# Patient Record
Sex: FEMALE | Race: WHITE | Employment: FULL TIME | URBAN - METROPOLITAN AREA
[De-identification: names, ages, dates, MRNs, and addresses within clinical notes are randomized per-mention and may not be internally consistent; named-entity substitution may affect disease eponyms.]

---

## 2017-05-17 ENCOUNTER — OFFICE VISIT (OUTPATIENT)
Dept: FAMILY MEDICINE CLINIC | Age: 42
End: 2017-05-17

## 2017-05-17 VITALS
DIASTOLIC BLOOD PRESSURE: 60 MMHG | BODY MASS INDEX: 34.74 KG/M2 | HEIGHT: 61 IN | HEART RATE: 68 BPM | RESPIRATION RATE: 16 BRPM | TEMPERATURE: 98.1 F | SYSTOLIC BLOOD PRESSURE: 120 MMHG | WEIGHT: 184 LBS

## 2017-05-17 DIAGNOSIS — J06.9 VIRAL UPPER RESPIRATORY TRACT INFECTION: Primary | ICD-10-CM

## 2017-05-17 PROCEDURE — 99213 OFFICE O/P EST LOW 20 MIN: CPT | Performed by: FAMILY MEDICINE

## 2017-05-17 RX ORDER — DEXTROMETHORPHAN HYDROBROMIDE AND PROMETHAZINE HYDROCHLORIDE 15; 6.25 MG/5ML; MG/5ML
5 SYRUP ORAL 4 TIMES DAILY PRN
Qty: 240 ML | Refills: 0 | Status: SHIPPED | OUTPATIENT
Start: 2017-05-17 | End: 2017-05-24

## 2017-05-17 RX ORDER — FLUTICASONE PROPIONATE 50 MCG
1 SPRAY, SUSPENSION (ML) NASAL DAILY
Qty: 1 BOTTLE | Refills: 5 | Status: SHIPPED | OUTPATIENT
Start: 2017-05-17 | End: 2020-02-28 | Stop reason: ALTCHOICE

## 2018-01-26 ENCOUNTER — OFFICE VISIT (OUTPATIENT)
Dept: FAMILY MEDICINE CLINIC | Age: 43
End: 2018-01-26

## 2018-01-26 VITALS
OXYGEN SATURATION: 98 % | WEIGHT: 193 LBS | RESPIRATION RATE: 16 BRPM | HEIGHT: 61 IN | TEMPERATURE: 98.5 F | HEART RATE: 62 BPM | SYSTOLIC BLOOD PRESSURE: 118 MMHG | DIASTOLIC BLOOD PRESSURE: 60 MMHG | BODY MASS INDEX: 36.44 KG/M2

## 2018-01-26 DIAGNOSIS — J20.9 ACUTE BRONCHITIS, UNSPECIFIED ORGANISM: Primary | ICD-10-CM

## 2018-01-26 PROCEDURE — G8417 CALC BMI ABV UP PARAM F/U: HCPCS | Performed by: FAMILY MEDICINE

## 2018-01-26 PROCEDURE — G8427 DOCREV CUR MEDS BY ELIG CLIN: HCPCS | Performed by: FAMILY MEDICINE

## 2018-01-26 PROCEDURE — 1036F TOBACCO NON-USER: CPT | Performed by: FAMILY MEDICINE

## 2018-01-26 PROCEDURE — 99213 OFFICE O/P EST LOW 20 MIN: CPT | Performed by: FAMILY MEDICINE

## 2018-01-26 PROCEDURE — G8484 FLU IMMUNIZE NO ADMIN: HCPCS | Performed by: FAMILY MEDICINE

## 2018-01-26 RX ORDER — DEXTROMETHORPHAN HYDROBROMIDE AND PROMETHAZINE HYDROCHLORIDE 15; 6.25 MG/5ML; MG/5ML
5 SYRUP ORAL 4 TIMES DAILY PRN
Qty: 240 ML | Refills: 0 | Status: SHIPPED | OUTPATIENT
Start: 2018-01-26 | End: 2018-02-02

## 2018-01-26 RX ORDER — AZITHROMYCIN 250 MG/1
TABLET, FILM COATED ORAL
Qty: 1 PACKET | Refills: 0 | Status: SHIPPED | OUTPATIENT
Start: 2018-01-26 | End: 2018-01-31

## 2018-01-26 RX ORDER — PREDNISONE 20 MG/1
20 TABLET ORAL 2 TIMES DAILY
Qty: 10 TABLET | Refills: 0 | Status: SHIPPED | OUTPATIENT
Start: 2018-01-26 | End: 2018-01-31

## 2018-01-26 ASSESSMENT — PATIENT HEALTH QUESTIONNAIRE - PHQ9
SUM OF ALL RESPONSES TO PHQ QUESTIONS 1-9: 0
2. FEELING DOWN, DEPRESSED OR HOPELESS: 0
1. LITTLE INTEREST OR PLEASURE IN DOING THINGS: 0
SUM OF ALL RESPONSES TO PHQ9 QUESTIONS 1 & 2: 0

## 2018-01-26 NOTE — PROGRESS NOTES
Subjective:      Chief Complaint   Patient presents with    Cough     x 2 weeks       Gina Echevarria is a 40 y.o. female who presents for evaluation of symptoms of URI. Onset of symptoms was 2 weeks ago. Symptoms include low grade fever, nasal congestion, productive cough with  brown colored sputum, shortness of breath, wheezing and post nasal drip and are gradually worsening since that time. Other symptoms: frontal bilat headaches  Denies:high fever. Tried OTC: cough drops, tylenol with some relief of symptoms     Review of Systems  Pertinent items are noted in HPI. Patient's medications, allergies, past medical, surgical, social and family histories were reviewed and updated as appropriate. Current Outpatient Prescriptions on File Prior to Visit   Medication Sig Dispense Refill    fluticasone (FLONASE) 50 MCG/ACT nasal spray 1 spray by Nasal route daily 1 Bottle 5    Liraglutide -Weight Management (SAXENDA) 6 MG/ML SOPN Inject 1.2 mg/mL into the skin daily      cabergoline (DOSTINEX) 0.5 MG tablet 0.5 mg Twice a Week        No current facility-administered medications on file prior to visit.       Allergies   Allergen Reactions    Dilantin [Phenytoin Sodium]     Inderal La [Propranolol Hcl]     Levofloxacin Hives    Phenobarbital Sodium     Sulfa Antibiotics Swelling     Past Medical History:   Diagnosis Date    BCC (basal cell carcinoma of skin), nose     Diabetes mellitus arising in pregnancy     Hypercholesterolemia     Hyperprolactinemia (HCC)     Nausea & vomiting     Pituitary tumor     Seizure disorder, Hx of     Unspecified urethral stricture (Hx of)      Past Surgical History:   Procedure Laterality Date    ABDOMINAL EXPLORATION SURGERY      endometriosis    BREAST SURGERY  2011    left ductectomy- BENIGN    CHOLECYSTECTOMY      HERNIA REPAIR      umbilical    NOSE SURGERY      broken nose    TONSILLECTOMY AND ADENOIDECTOMY      WISDOM TOOTH EXTRACTION       Social History     Social History    Marital status:      Spouse name: N/A    Number of children: N/A    Years of education: N/A     Occupational History    Not on file. Social History Main Topics    Smoking status: Never Smoker    Smokeless tobacco: Never Used      Comment: congrats    Alcohol use 2.0 oz/week     4 drink(s) per week    Drug use: No    Sexual activity: Yes     Partners: Male     Other Topics Concern    Not on file     Social History Narrative    No narrative on file     Family History   Problem Relation Age of Onset    Diabetes Father     Cancer Maternal Grandmother 28     breast     Health Maintenance   Topic Date Due    HIV screen  09/18/1991    Cervical cancer screen  09/18/1997    DTaP/Tdap/Td vaccine (2 - Td) 07/03/2016    Lipid screen  09/18/2016    Diabetes screen  09/18/2016    Flu vaccine (1) 09/01/2017       Objective:   PHYSICAL EXAM  /60 (Site: Right Arm, Position: Sitting, Cuff Size: Large Adult)   Pulse 62   Temp 98.5 °F (36.9 °C) (Oral)   Resp 16   Ht 5' 1\" (1.549 m)   Wt 193 lb (87.5 kg)   LMP 01/16/2018   SpO2 98%   BMI 36.47 kg/m²   Wt Readings from Last 3 Encounters:   01/26/18 193 lb (87.5 kg)   05/17/17 184 lb (83.5 kg)   06/23/15 194 lb (88 kg)     BP Readings from Last 3 Encounters:   01/26/18 118/60   05/17/17 120/60   06/23/15 118/70     GENERAL: well-developed, well-nourished, alert, no distress  EYES: negative findings: lids and lashes normal and conjunctivae and sclerae normal  ENT: normal TM's and external ear canals both ears  · External nose and ears appear normal  · Pharynx: red, cobblestoned.  Exudates: None  · Lips, mucosa, and tongue normal and teeth normal  · Hearing grossly normal  NECK: Supple, symmetrical, trachea midline  · Thyroid not enlarged, symmetric, no tenderness/mass/nodules  · no cervical nodes, no supraclavicular nodes  LUNGS:  Breathing unlabored  · clear to auscultation bilaterally,  fair air

## 2018-01-26 NOTE — PATIENT INSTRUCTIONS
Please finish taking ALL of the antibiotics. May take Mucinex (guaifenisen) and Robitussin DM (guafenisen, dextromethorphan) for cough and congestion. May also try Vicks Vaporub. May take ibuprofen (Motrin, Advil) 200 mg tabs up to 4 tabs every 8 hours. May also take acetaminophen (Tylenol) as instructed on packaging. Please call if symptoms getting worse. Please get flu vaccine when available in fall. Can get either at this office or at stores such as [employer].

## 2018-11-30 ENCOUNTER — TELEPHONE (OUTPATIENT)
Dept: FAMILY MEDICINE CLINIC | Age: 43
End: 2018-11-30

## 2018-11-30 RX ORDER — AZITHROMYCIN 250 MG/1
TABLET, FILM COATED ORAL
Qty: 1 PACKET | Refills: 0 | Status: SHIPPED | OUTPATIENT
Start: 2018-11-30 | End: 2018-12-05

## 2018-11-30 NOTE — TELEPHONE ENCOUNTER
Please notify patient that prescription was e-scribed to pharmacy for antibiotic.  Please schedule well visit after holidays

## 2020-02-24 ENCOUNTER — HOSPITAL ENCOUNTER (EMERGENCY)
Age: 45
Discharge: HOME | End: 2020-02-24
Attending: EMERGENCY MEDICINE
Payer: COMMERCIAL

## 2020-02-24 ENCOUNTER — APPOINTMENT (OUTPATIENT)
Dept: CT IMAGING | Age: 45
End: 2020-02-24
Payer: COMMERCIAL

## 2020-02-24 VITALS
SYSTOLIC BLOOD PRESSURE: 104 MMHG | BODY MASS INDEX: 39.29 KG/M2 | RESPIRATION RATE: 16 BRPM | TEMPERATURE: 98.1 F | HEIGHT: 61 IN | WEIGHT: 208.11 LBS | DIASTOLIC BLOOD PRESSURE: 52 MMHG | HEART RATE: 73 BPM | OXYGEN SATURATION: 98 %

## 2020-02-24 LAB
A/G RATIO: 1.3 (ref 1.1–2.2)
ALBUMIN SERPL-MCNC: 3.8 G/DL (ref 3.4–5)
ALP BLD-CCNC: 92 U/L (ref 40–129)
ALT SERPL-CCNC: 31 U/L (ref 10–40)
ANION GAP SERPL CALCULATED.3IONS-SCNC: 12 MMOL/L (ref 3–16)
AST SERPL-CCNC: 23 U/L (ref 15–37)
BASOPHILS ABSOLUTE: 0.1 K/UL (ref 0–0.2)
BASOPHILS RELATIVE PERCENT: 0.5 %
BILIRUB SERPL-MCNC: 0.5 MG/DL (ref 0–1)
BILIRUBIN URINE: NEGATIVE
BLOOD, URINE: NEGATIVE
BUN BLDV-MCNC: 10 MG/DL (ref 7–20)
CALCIUM SERPL-MCNC: 8.9 MG/DL (ref 8.3–10.6)
CHLORIDE BLD-SCNC: 102 MMOL/L (ref 99–110)
CLARITY: CLEAR
CO2: 24 MMOL/L (ref 21–32)
COLOR: YELLOW
CREAT SERPL-MCNC: 0.6 MG/DL (ref 0.6–1.1)
EOSINOPHILS ABSOLUTE: 0.2 K/UL (ref 0–0.6)
EOSINOPHILS RELATIVE PERCENT: 1.7 %
GFR AFRICAN AMERICAN: >60
GFR NON-AFRICAN AMERICAN: >60
GLOBULIN: 3 G/DL
GLUCOSE BLD-MCNC: 95 MG/DL (ref 70–99)
GLUCOSE URINE: NEGATIVE MG/DL
HCG(URINE) PREGNANCY TEST: NEGATIVE
HCT VFR BLD CALC: 38.1 % (ref 36–48)
HEMOGLOBIN: 12.8 G/DL (ref 12–16)
KETONES, URINE: NEGATIVE MG/DL
LEUKOCYTE ESTERASE, URINE: NEGATIVE
LIPASE: 24 U/L (ref 13–60)
LYMPHOCYTES ABSOLUTE: 2.2 K/UL (ref 1–5.1)
LYMPHOCYTES RELATIVE PERCENT: 17.8 %
MCH RBC QN AUTO: 29.1 PG (ref 26–34)
MCHC RBC AUTO-ENTMCNC: 33.5 G/DL (ref 31–36)
MCV RBC AUTO: 86.8 FL (ref 80–100)
MICROSCOPIC EXAMINATION: NORMAL
MONOCYTES ABSOLUTE: 1.3 K/UL (ref 0–1.3)
MONOCYTES RELATIVE PERCENT: 10.3 %
NEUTROPHILS ABSOLUTE: 8.5 K/UL (ref 1.7–7.7)
NEUTROPHILS RELATIVE PERCENT: 69.7 %
NITRITE, URINE: NEGATIVE
PDW BLD-RTO: 13.9 % (ref 12.4–15.4)
PH UA: 5.5 (ref 5–8)
PLATELET # BLD: 249 K/UL (ref 135–450)
PMV BLD AUTO: 8.3 FL (ref 5–10.5)
POTASSIUM REFLEX MAGNESIUM: 3.9 MMOL/L (ref 3.5–5.1)
PROTEIN UA: NEGATIVE MG/DL
RBC # BLD: 4.39 M/UL (ref 4–5.2)
SODIUM BLD-SCNC: 138 MMOL/L (ref 136–145)
SPECIFIC GRAVITY UA: 1.02 (ref 1–1.03)
TOTAL PROTEIN: 6.8 G/DL (ref 6.4–8.2)
URINE REFLEX TO CULTURE: NORMAL
URINE TYPE: NORMAL
UROBILINOGEN, URINE: 0.2 E.U./DL
WBC # BLD: 12.1 K/UL (ref 4–11)

## 2020-02-24 PROCEDURE — 96375 TX/PRO/DX INJ NEW DRUG ADDON: CPT

## 2020-02-24 PROCEDURE — 81003 URINALYSIS AUTO W/O SCOPE: CPT

## 2020-02-24 PROCEDURE — 74176 CT ABD & PELVIS W/O CONTRAST: CPT

## 2020-02-24 PROCEDURE — 99284 EMERGENCY DEPT VISIT MOD MDM: CPT

## 2020-02-24 PROCEDURE — 85025 COMPLETE CBC W/AUTO DIFF WBC: CPT

## 2020-02-24 PROCEDURE — 84703 CHORIONIC GONADOTROPIN ASSAY: CPT

## 2020-02-24 PROCEDURE — 83690 ASSAY OF LIPASE: CPT

## 2020-02-24 PROCEDURE — 6360000002 HC RX W HCPCS: Performed by: EMERGENCY MEDICINE

## 2020-02-24 PROCEDURE — 2580000003 HC RX 258: Performed by: EMERGENCY MEDICINE

## 2020-02-24 PROCEDURE — 80053 COMPREHEN METABOLIC PANEL: CPT

## 2020-02-24 PROCEDURE — 96365 THER/PROPH/DIAG IV INF INIT: CPT

## 2020-02-24 RX ORDER — MORPHINE SULFATE 4 MG/ML
4 INJECTION, SOLUTION INTRAMUSCULAR; INTRAVENOUS ONCE
Status: COMPLETED | OUTPATIENT
Start: 2020-02-24 | End: 2020-02-24

## 2020-02-24 RX ORDER — HYDROCODONE BITARTRATE AND ACETAMINOPHEN 5; 325 MG/1; MG/1
1 TABLET ORAL EVERY 6 HOURS PRN
Qty: 12 TABLET | Refills: 0 | Status: SHIPPED | OUTPATIENT
Start: 2020-02-24 | End: 2020-02-27

## 2020-02-24 RX ORDER — AMOXICILLIN AND CLAVULANATE POTASSIUM 875; 125 MG/1; MG/1
1 TABLET, FILM COATED ORAL 2 TIMES DAILY
Qty: 20 TABLET | Refills: 0 | Status: SHIPPED | OUTPATIENT
Start: 2020-02-24 | End: 2020-03-05

## 2020-02-24 RX ORDER — ONDANSETRON 2 MG/ML
4 INJECTION INTRAMUSCULAR; INTRAVENOUS ONCE
Status: COMPLETED | OUTPATIENT
Start: 2020-02-24 | End: 2020-02-24

## 2020-02-24 RX ADMIN — MORPHINE SULFATE 4 MG: 4 INJECTION, SOLUTION INTRAMUSCULAR; INTRAVENOUS at 10:25

## 2020-02-24 RX ADMIN — ONDANSETRON 4 MG: 2 INJECTION INTRAMUSCULAR; INTRAVENOUS at 10:25

## 2020-02-24 RX ADMIN — PIPERACILLIN AND TAZOBACTAM 3.38 G: 3; .375 INJECTION, POWDER, LYOPHILIZED, FOR SOLUTION INTRAVENOUS at 12:04

## 2020-02-24 ASSESSMENT — PAIN SCALES - GENERAL
PAINLEVEL_OUTOF10: 3
PAINLEVEL_OUTOF10: 7
PAINLEVEL_OUTOF10: 6

## 2020-02-24 ASSESSMENT — PAIN DESCRIPTION - DESCRIPTORS: DESCRIPTORS: ACHING

## 2020-02-24 ASSESSMENT — PAIN DESCRIPTION - LOCATION: LOCATION: ABDOMEN

## 2020-02-24 ASSESSMENT — PAIN DESCRIPTION - PAIN TYPE: TYPE: ACUTE PAIN

## 2020-02-24 ASSESSMENT — PAIN DESCRIPTION - ORIENTATION: ORIENTATION: LOWER;RIGHT

## 2020-02-24 NOTE — ED TRIAGE NOTES
Patient to ED via private car c/o right lower abdominal pain starting  Yesterday after noon. Patient reports some nausea. Denies emesis or diarrhea.

## 2020-02-24 NOTE — ED PROVIDER NOTES
EKG: All EKG's are interpreted by Jennifer Medeiros MD in the absence of a cardiologist.      RADIOLOGY:   Non-plain film images such as CT, Ultrasound and MRI are read by the radiologist. Plain radiographic images are visualized and preliminarily interpreted Jennifer Medeiros MD with the below findings:      Interpretation per the Radiologist below, if available at the time of this note:    CT ABDOMEN PELVIS WO CONTRAST Additional Contrast? None   Final Result   Acute sigmoid diverticulitis. No fluid collection or free air. ED BEDSIDE ULTRASOUND:   Performed by ED Physician - none    LABS:  Labs Reviewed   CBC WITH AUTO DIFFERENTIAL - Abnormal; Notable for the following components:       Result Value    WBC 12.1 (*)     Neutrophils Absolute 8.5 (*)     All other components within normal limits    Narrative:     Performed at:  [facility]  [address], [place] [facility] [place] [address]   Phone [phone]   URINE RT REFLEX TO CULTURE    Narrative:     Performed at:  [place] [address], [place] [facility] [place] [address]   Phone [phone]   PREGNANCY, URINE    Narrative:     Performed at:  [facility]  [address] [facility] [place] [address]   Phone [phone]   COMPREHENSIVE METABOLIC PANEL W/ REFLEX TO MG FOR LOW K    Narrative:     Performed at:  [facility]  [address], [place] [facility] [place] [address]   Phone [phone]   LIPASE    Narrative:     Performed at:  [address] [place] [address] [address] [facility] [place] [address]   Phone [phone]       All other labs were within normal range or not returned as of this dictation.     EMERGENCY DEPARTMENT COURSE and DIFFERENTIAL DIAGNOSIS/MDM:   Vitals:    Vitals:    02/24/20 1030 02/24/20 1045 02/24/20 1100 02/24/20 1122   BP: 134/76 125/78 (!) 113/56 (!) 110/59   Pulse: 73   Resp:    14   Temp:       TempSrc:       SpO2: 99% 97% 95% 95%   Weight:       Height: This patient presented with some lower abdominal pain. She is afebrile. She has some lower abdominal tenderness on exam, more prominent on the right. Her white blood cell count is elevated at 12,100 with 69 neutrophils and 18 lymphs. No bands. She has findings consistent with sigmoid diverticulitis on CT scan. There is no evidence of perforation or abscess. She is not tachycardic, she is not febrile. I do not think she is septic. There is no evidence of cystitis, pyelonephritis, or ureterolithiasis. She is not pregnant. I think she can be initially managed as an outpatient. He was given a dose of Zosyn IV here. She will be discharged on Augmentin. I wrote for short-term pain control with Norco.  I advised her to consider using a stool softener for any constipation that may develop. I have asked her to follow-up with her primary care provider in 3 days for recheck. I discussed with her the complications of diverticulitis including worsening infection, abscess, perforation. I encouraged her to return here if she develops fever, vomiting, worsening abdominal pain. Test results, diagnosis, and treatment plan were discussed with the patient. She understands the treatment plan and follow-up as discussed. Controlled Substance Monitoring:    Acute and Chronic Pain Monitoring:   RX Monitoring 2/24/2020   Periodic Controlled Substance Monitoring Possible medication side effects, risk of tolerance/dependence & alternative treatments discussed. ;No signs of potential drug abuse or diversion identified. CONSULTS:  None    PROCEDURES:  None    FINAL IMPRESSION      1.  Sigmoid diverticulitis          DISPOSITION/PLAN   DISPOSITION Discharge - Pending Orders Complete 02/24/2020 11:42:56 AM      PATIENT REFERRED TO:  Lam Ramos MD  [address]

## 2020-02-28 ENCOUNTER — OFFICE VISIT (OUTPATIENT)
Dept: FAMILY MEDICINE CLINIC | Age: 45
End: 2020-02-28
Payer: COMMERCIAL

## 2020-02-28 VITALS
HEART RATE: 76 BPM | HEIGHT: 61 IN | WEIGHT: 207 LBS | DIASTOLIC BLOOD PRESSURE: 60 MMHG | SYSTOLIC BLOOD PRESSURE: 100 MMHG | OXYGEN SATURATION: 97 % | TEMPERATURE: 98.7 F | BODY MASS INDEX: 39.08 KG/M2

## 2020-02-28 PROCEDURE — G8427 DOCREV CUR MEDS BY ELIG CLIN: HCPCS | Performed by: NURSE PRACTITIONER

## 2020-02-28 PROCEDURE — 1036F TOBACCO NON-USER: CPT | Performed by: NURSE PRACTITIONER

## 2020-02-28 PROCEDURE — 99213 OFFICE O/P EST LOW 20 MIN: CPT | Performed by: NURSE PRACTITIONER

## 2020-02-28 PROCEDURE — G8484 FLU IMMUNIZE NO ADMIN: HCPCS | Performed by: NURSE PRACTITIONER

## 2020-02-28 PROCEDURE — G8417 CALC BMI ABV UP PARAM F/U: HCPCS | Performed by: NURSE PRACTITIONER

## 2020-02-28 RX ORDER — HYDROCODONE BITARTRATE AND ACETAMINOPHEN 5; 325 MG/1; MG/1
1 TABLET ORAL EVERY 6 HOURS PRN
COMMUNITY

## 2020-02-28 ASSESSMENT — ENCOUNTER SYMPTOMS
ABDOMINAL PAIN: 1
DIARRHEA: 0
CONSTIPATION: 0
COUGH: 0
NAUSEA: 1
VOMITING: 0
SHORTNESS OF BREATH: 0

## 2020-02-28 ASSESSMENT — PATIENT HEALTH QUESTIONNAIRE - PHQ9
1. LITTLE INTEREST OR PLEASURE IN DOING THINGS: 0
SUM OF ALL RESPONSES TO PHQ9 QUESTIONS 1 & 2: 0
SUM OF ALL RESPONSES TO PHQ QUESTIONS 1-9: 0
2. FEELING DOWN, DEPRESSED OR HOPELESS: 0
SUM OF ALL RESPONSES TO PHQ QUESTIONS 1-9: 0

## 2020-02-28 NOTE — PROGRESS NOTES
2/28/2020    This is a 38 y.o. female   Chief Complaint   Patient presents with    Follow-up     er,tenderness around stomach as well as bloating and pressure   . HPI  Patient is here for ER f/u from 2/24/20 for sigmoid diverticulitis. Patient reports that on Sunday 2/23/20 started with lower right abd pain. She was moving this past weekend and at first thought the pain was due to that. She was taking alternating tylenol and advil without improvement in pain. Had diarrhea on Sunday. Denies blood in stool. Went to ER for evaluation. CT scan showed acute sigmoid diverticulitis. Was given augmentin 875 mg BID for 10 days. Has not had recurrent diarrhea. Had a BM yesterday which was formed. She continues with bloating and soreness. Pain is constant at 3/10. Pain was 7/10. Stopped taking the norco on Wed. Now taking tylenol BID with improvement in pain. Denies blood in stool. Positive for nausea but not vomiting. Has been drinking water. Had a colonoscopy at least 5 years ago with Dr. Hazel Collazo? Thinks the results were normal.     Follows with endo Dr. Pernell Esteves for pituitary tumor. Patient Active Problem List   Diagnosis    Hyperprolactinemia ([facility] [address]    Cushing's syndrome (HCC)    BCC (basal cell carcinoma of skin), nose    Hypercholesterolemia    Urethral stricture    Seizure disorder, Hx of    Urticaria    Allergic rhinitis    Abnormal mammogram- S/P biopsy, mammo q 6 mo    Plantar fascia syndrome    Chronic abdominal pain          Current Outpatient Medications   Medication Sig Dispense Refill    HYDROcodone-acetaminophen (NORCO) 5-325 MG per tablet Take 1 tablet by mouth every 6 hours as needed for Pain.  amoxicillin-clavulanate (AUGMENTIN) 875-125 MG per tablet Take 1 tablet by mouth 2 times daily for 10 days 20 tablet 0    cabergoline (DOSTINEX) 0.5 MG tablet 0.5 mg Twice a Week        No current facility-administered medications for this visit.         Allergies   Allergen Reactions    Dilantin [Phenytoin Sodium]     Inderal La [Propranolol Hcl]     Levofloxacin Hives    Phenobarbital Sodium     Sulfa Antibiotics Swelling       Review of Systems   Constitutional: Positive for activity change and fatigue. Negative for fever. Respiratory: Negative for cough and shortness of breath. Cardiovascular: Negative for chest pain. Gastrointestinal: Positive for abdominal pain and nausea. Negative for constipation, diarrhea and vomiting. Genitourinary: Negative for difficulty urinating, dysuria, frequency and hematuria. Neurological: Negative for dizziness and headaches. Vitals:    02/28/20 1406   BP: 100/60   Site: Right Upper Arm   Position: Sitting   Cuff Size: Medium Adult   Pulse: 76   Temp: 98.7 °F (37.1 °C)   SpO2: 97%   Weight: 207 lb (93.9 kg)   Height: 5' 1\" (1.549 m)       Body mass index is 39.11 kg/m². Wt Readings from Last 3 Encounters:   02/28/20 207 lb (93.9 kg)   02/24/20 208 lb 1.8 oz (94.4 kg)   01/26/18 193 lb (87.5 kg)       BP Readings from Last 3 Encounters:   02/28/20 100/60   02/24/20 (!) 104/52   01/26/18 118/60       Physical Exam  Vitals signs and nursing note reviewed. Constitutional:       General: She is not in acute distress. Appearance: She is well-developed. HENT:      Head: Normocephalic and atraumatic. Neck:      Musculoskeletal: Neck supple. Cardiovascular:      Rate and Rhythm: Normal rate and regular rhythm. Heart sounds: Normal heart sounds. No murmur. No friction rub. No gallop. Pulmonary:      Effort: Pulmonary effort is normal. No respiratory distress. Breath sounds: Normal breath sounds. Abdominal:      General: Bowel sounds are normal.      Palpations: Abdomen is soft. Tenderness: There is abdominal tenderness in the right lower quadrant and suprapubic area. There is no right CVA tenderness, left CVA tenderness or guarding. Skin:     General: Skin is warm and dry.    Neurological:      Mental

## 2021-02-05 ENCOUNTER — APPOINTMENT (OUTPATIENT)
Dept: CT IMAGING | Age: 46
End: 2021-02-05
Payer: COMMERCIAL

## 2021-02-05 ENCOUNTER — HOSPITAL ENCOUNTER (EMERGENCY)
Age: 46
Discharge: HOME | End: 2021-02-05
Attending: EMERGENCY MEDICINE
Payer: COMMERCIAL

## 2021-02-05 ENCOUNTER — NURSE TRIAGE (OUTPATIENT)
Dept: OTHER | Facility: CLINIC | Age: 46
End: 2021-02-05

## 2021-02-05 VITALS
BODY MASS INDEX: 40.45 KG/M2 | DIASTOLIC BLOOD PRESSURE: 78 MMHG | SYSTOLIC BLOOD PRESSURE: 134 MMHG | HEART RATE: 74 BPM | RESPIRATION RATE: 17 BRPM | TEMPERATURE: 98 F | WEIGHT: 214.07 LBS | OXYGEN SATURATION: 98 %

## 2021-02-05 DIAGNOSIS — K57.32 SIGMOID DIVERTICULITIS: Primary | ICD-10-CM

## 2021-02-05 LAB
A/G RATIO: 1.1 (ref 1.1–2.2)
ALBUMIN SERPL-MCNC: 4 G/DL (ref 3.4–5)
ALP BLD-CCNC: 108 U/L (ref 40–129)
ALT SERPL-CCNC: 37 U/L (ref 10–40)
ANION GAP SERPL CALCULATED.3IONS-SCNC: 13 MMOL/L (ref 3–16)
AST SERPL-CCNC: 42 U/L (ref 15–37)
BACTERIA: ABNORMAL /HPF
BASOPHILS ABSOLUTE: 0.1 K/UL (ref 0–0.2)
BASOPHILS RELATIVE PERCENT: 0.6 %
BILIRUB SERPL-MCNC: 0.5 MG/DL (ref 0–1)
BILIRUBIN URINE: NEGATIVE
BLOOD, URINE: ABNORMAL
BUN BLDV-MCNC: 10 MG/DL (ref 7–20)
CALCIUM SERPL-MCNC: 9.6 MG/DL (ref 8.3–10.6)
CHLORIDE BLD-SCNC: 103 MMOL/L (ref 99–110)
CLARITY: ABNORMAL
CO2: 23 MMOL/L (ref 21–32)
COLOR: YELLOW
COMMENT UA: ABNORMAL
CREAT SERPL-MCNC: 0.8 MG/DL (ref 0.6–1.1)
EOSINOPHILS ABSOLUTE: 0.2 K/UL (ref 0–0.6)
EOSINOPHILS RELATIVE PERCENT: 1 %
EPITHELIAL CELLS, UA: 4 /HPF (ref 0–5)
GFR AFRICAN AMERICAN: >60
GFR NON-AFRICAN AMERICAN: >60
GLOBULIN: 3.5 G/DL
GLUCOSE BLD-MCNC: 90 MG/DL (ref 70–99)
GLUCOSE URINE: NEGATIVE MG/DL
HCG QUALITATIVE: NEGATIVE
HCT VFR BLD CALC: 39.3 % (ref 36–48)
HEMOGLOBIN: 13.1 G/DL (ref 12–16)
HYALINE CASTS: 3 /LPF (ref 0–8)
KETONES, URINE: NEGATIVE MG/DL
LEUKOCYTE ESTERASE, URINE: ABNORMAL
LIPASE: 25 U/L (ref 13–60)
LYMPHOCYTES ABSOLUTE: 2.8 K/UL (ref 1–5.1)
LYMPHOCYTES RELATIVE PERCENT: 19.1 %
MCH RBC QN AUTO: 28.7 PG (ref 26–34)
MCHC RBC AUTO-ENTMCNC: 33.2 G/DL (ref 31–36)
MCV RBC AUTO: 86.3 FL (ref 80–100)
MICROSCOPIC EXAMINATION: YES
MONOCYTES ABSOLUTE: 1.4 K/UL (ref 0–1.3)
MONOCYTES RELATIVE PERCENT: 9.3 %
NEUTROPHILS ABSOLUTE: 10.2 K/UL (ref 1.7–7.7)
NEUTROPHILS RELATIVE PERCENT: 70 %
NITRITE, URINE: NEGATIVE
PDW BLD-RTO: 13.8 % (ref 12.4–15.4)
PH UA: 6 (ref 5–8)
PLATELET # BLD: 310 K/UL (ref 135–450)
PMV BLD AUTO: 8.2 FL (ref 5–10.5)
POTASSIUM REFLEX MAGNESIUM: 4.3 MMOL/L (ref 3.5–5.1)
PROTEIN UA: ABNORMAL MG/DL
RBC # BLD: 4.55 M/UL (ref 4–5.2)
RBC UA: ABNORMAL /HPF (ref 0–4)
SODIUM BLD-SCNC: 139 MMOL/L (ref 136–145)
SPECIFIC GRAVITY UA: 1.01 (ref 1–1.03)
TOTAL PROTEIN: 7.5 G/DL (ref 6.4–8.2)
URINE REFLEX TO CULTURE: YES
URINE TYPE: ABNORMAL
UROBILINOGEN, URINE: 0.2 E.U./DL
WBC # BLD: 14.6 K/UL (ref 4–11)
WBC UA: 43 /HPF (ref 0–5)

## 2021-02-05 PROCEDURE — 6370000000 HC RX 637 (ALT 250 FOR IP): Performed by: EMERGENCY MEDICINE

## 2021-02-05 PROCEDURE — 96375 TX/PRO/DX INJ NEW DRUG ADDON: CPT

## 2021-02-05 PROCEDURE — 85025 COMPLETE CBC W/AUTO DIFF WBC: CPT

## 2021-02-05 PROCEDURE — 2580000003 HC RX 258: Performed by: EMERGENCY MEDICINE

## 2021-02-05 PROCEDURE — 99283 EMERGENCY DEPT VISIT LOW MDM: CPT

## 2021-02-05 PROCEDURE — 96365 THER/PROPH/DIAG IV INF INIT: CPT

## 2021-02-05 PROCEDURE — 81001 URINALYSIS AUTO W/SCOPE: CPT

## 2021-02-05 PROCEDURE — 84703 CHORIONIC GONADOTROPIN ASSAY: CPT

## 2021-02-05 PROCEDURE — 74177 CT ABD & PELVIS W/CONTRAST: CPT

## 2021-02-05 PROCEDURE — 6360000004 HC RX CONTRAST MEDICATION: Performed by: EMERGENCY MEDICINE

## 2021-02-05 PROCEDURE — 83690 ASSAY OF LIPASE: CPT

## 2021-02-05 PROCEDURE — 87077 CULTURE AEROBIC IDENTIFY: CPT

## 2021-02-05 PROCEDURE — 80053 COMPREHEN METABOLIC PANEL: CPT

## 2021-02-05 PROCEDURE — 6360000002 HC RX W HCPCS: Performed by: EMERGENCY MEDICINE

## 2021-02-05 PROCEDURE — 87086 URINE CULTURE/COLONY COUNT: CPT

## 2021-02-05 RX ORDER — OXYCODONE HYDROCHLORIDE AND ACETAMINOPHEN 5; 325 MG/1; MG/1
1 TABLET ORAL ONCE
Status: COMPLETED | OUTPATIENT
Start: 2021-02-05 | End: 2021-02-05

## 2021-02-05 RX ORDER — MORPHINE SULFATE 2 MG/ML
2 INJECTION, SOLUTION INTRAMUSCULAR; INTRAVENOUS ONCE
Status: COMPLETED | OUTPATIENT
Start: 2021-02-05 | End: 2021-02-05

## 2021-02-05 RX ORDER — AMOXICILLIN AND CLAVULANATE POTASSIUM 875; 125 MG/1; MG/1
1 TABLET, FILM COATED ORAL 2 TIMES DAILY
Qty: 20 TABLET | Refills: 0 | Status: SHIPPED | OUTPATIENT
Start: 2021-02-05 | End: 2021-02-15

## 2021-02-05 RX ORDER — ONDANSETRON 2 MG/ML
4 INJECTION INTRAMUSCULAR; INTRAVENOUS ONCE
Status: COMPLETED | OUTPATIENT
Start: 2021-02-05 | End: 2021-02-05

## 2021-02-05 RX ORDER — IBUPROFEN 600 MG/1
600 TABLET ORAL 4 TIMES DAILY PRN
Qty: 40 TABLET | Refills: 0 | Status: SHIPPED | OUTPATIENT
Start: 2021-02-05

## 2021-02-05 RX ADMIN — ONDANSETRON 4 MG: 2 INJECTION INTRAMUSCULAR; INTRAVENOUS at 13:30

## 2021-02-05 RX ADMIN — OXYCODONE HYDROCHLORIDE AND ACETAMINOPHEN 1 TABLET: 5; 325 TABLET ORAL at 15:05

## 2021-02-05 RX ADMIN — PIPERACILLIN AND TAZOBACTAM 4500 MG: 4; .5 INJECTION, POWDER, FOR SOLUTION INTRAVENOUS at 15:04

## 2021-02-05 RX ADMIN — MORPHINE SULFATE 2 MG: 2 INJECTION, SOLUTION INTRAMUSCULAR; INTRAVENOUS at 13:30

## 2021-02-05 RX ADMIN — IOPAMIDOL 75 ML: 755 INJECTION, SOLUTION INTRAVENOUS at 13:49

## 2021-02-05 ASSESSMENT — PAIN DESCRIPTION - DESCRIPTORS: DESCRIPTORS: SHARP

## 2021-02-05 ASSESSMENT — PAIN SCALES - GENERAL
PAINLEVEL_OUTOF10: 7
PAINLEVEL_OUTOF10: 7

## 2021-02-05 ASSESSMENT — PAIN DESCRIPTION - PAIN TYPE: TYPE: ACUTE PAIN

## 2021-02-05 ASSESSMENT — PAIN DESCRIPTION - LOCATION: LOCATION: ABDOMEN

## 2021-02-05 ASSESSMENT — PAIN DESCRIPTION - ORIENTATION
ORIENTATION: RIGHT
ORIENTATION: RIGHT

## 2021-02-05 NOTE — ED PROVIDER NOTES
[address] [facility]      Pt Name: Rafal Lora  MRN: [id number]  [place] 1975  Date of evaluation: 2/5/2021  Provider: Josue Anders MD    CHIEF COMPLAINT       Chief Complaint   Patient presents with    Abdominal Pain     Right sided since midnight with nausea. Denies vomiting/diarrhea         HISTORY OF PRESENT ILLNESS   (Location/Symptom, Timing/Onset,Context/Setting, Quality, Duration, Modifying Factors, Severity)  Note limiting factors. Rafal Lora is a 41 y.o. female who presents to the emergency department for evaluation of right lower quadrant abdominal pain. Patient reports that pain started at approximately midnight last night. She states that the pain was severe overnight, sharp and cramping intermittently. She states that the pain started in the periumbilical region and is now localized to the right lower quadrant. She states she is had chills and sweats overnight as well as some nausea. She states that she has not vomited. She had one episode of diverticulitis in the past which she says feels somewhat similar. She also has history of prior cholecystectomy, and hernia surgery. Denies chest pain or shortness of breath. Denies any urinary symptoms. Reports normal bowel movements since onset of the pain. NursingNotes were reviewed. REVIEW OF SYSTEMS    (2-9 systems for level 4, 10 or more for level 5)       Constitutional: No fever. Chills and sweats. Eye: No visual disturbances. No eye pain. Ear/Nose/Mouth/Throat: No nasal congestion. No sore throat. Respiratory: No cough, No shortness of breath, No sputum production. Cardiovascular: No chest pain. No palpitations. Gastrointestinal: Right lower quadrant abdominal pain. Nausea, no vomiting  Genitourinary: No dysuria. No hematuria. Hematology/Lymphatics: No bleeding or bruising tendency. Immunologic: No malaise. No swollen glands.   Musculoskeletal: No back pain. No joint pain. Integumentary: No rash. No abrasions. Neurologic: No headache. No focal numbness or weakness. PAST MEDICAL HISTORY     Past Medical History:   Diagnosis Date    BCC (basal cell carcinoma of skin), nose     Diabetes mellitus arising in pregnancy     Hypercholesterolemia     Hyperprolactinemia (HCC)     Nausea & vomiting     Pituitary tumor     Seizure disorder, Hx of     Unspecified urethral stricture (Hx of)          SURGICALHISTORY       Past Surgical History:   Procedure Laterality Date    ABDOMINAL EXPLORATION SURGERY      endometriosis    BREAST SURGERY  2011    left ductectomy- BENIGN    CHOLECYSTECTOMY      HERNIA REPAIR      umbilical    NOSE SURGERY      broken nose    TONSILLECTOMY AND ADENOIDECTOMY      WISDOM TOOTH EXTRACTION           CURRENT MEDICATIONS       Discharge Medication List as of 2/5/2021  3:56 PM      CONTINUE these medications which have NOT CHANGED    Details   HYDROcodone-acetaminophen (NORCO) 5-325 MG per tablet Take 1 tablet by mouth every 6 hours as needed for Pain. Historical Med      cabergoline (DOSTINEX) 0.5 MG tablet 0.5 mg Twice a Week Historical Med             ALLERGIES     Dilantin [phenytoin sodium], Inderal la [propranolol hcl], Levofloxacin, Phenobarbital sodium, and Sulfa antibiotics    FAMILY HISTORY       Family History   Problem Relation Age of Onset    Diabetes Father     Cancer Maternal Grandmother 28        breast          SOCIAL HISTORY       Social History     Socioeconomic History    Marital status:      Spouse name: Not on file    Number of children: Not on file    Years of education: Not on file    Highest education level: Not on file   Occupational History    Not on file   Social Needs    Financial resource strain: Not on file    Food insecurity     Worry: Not on file     Inability: Not on file    Transportation needs     Medical: Not on file     Non-medical: Not on file   Tobacco Use    Smoking status: Never Smoker    Smokeless tobacco: Never Used    Tobacco comment: congrats   Substance and Sexual Activity    Alcohol use: Yes     Alcohol/week: 3.3 standard drinks     Types: 4 Standard drinks or equivalent per week    Drug use: No    Sexual activity: Yes     Partners: Male   Lifestyle    Physical activity     Days per week: Not on file     Minutes per session: Not on file    Stress: Not on file   Relationships    Social connections     Talks on phone: Not on file     Gets together: Not on file     Attends Rastafari service: Not on file     Active member of club or organization: Not on file     Attends meetings of clubs or organizations: Not on file     Relationship status: Not on file    Intimate partner violence     Fear of current or ex partner: Not on file     Emotionally abused: Not on file     Physically abused: Not on file     Forced sexual activity: Not on file   Other Topics Concern    Not on file   Social History Narrative    Not on file       SCREENINGS             PHYSICAL EXAM    (up to 7 for level 4, 8 or more for level 5)     ED Triage Vitals [02/05/21 1142]   BP Temp Temp Source Pulse Resp SpO2 Height Weight   127/84 99 °F (37.2 °C) Oral 82 18 99 % -- 214 lb 1.1 oz (97.1 kg)       General: Alert and oriented, No acute distress. Eye: Normal conjunctiva. Pupils equal and reactive. HENT: Oral mucosa is moist.  Respiratory: Lungs are clear to auscultation, Respirations are non-labored. Cardiovascular: Normal rate, Regular rhythm. Gastrointestinal: Soft, tender to palpation in the right lower quadrant with guarding, Non-distended. Musculoskeletal: No swelling. Integumentary: Warm, Dry. Neurologic: Alert, Oriented, No focal defects. Psychiatric: Cooperative.     DIAGNOSTIC RESULTS     EKG: All EKG's are interpreted by the Emergency Department Physician who either signs or Co-signsthis chart in the absence of a cardiologist.      RADIOLOGY:   Non-plain filmimages such as CT, Ultrasound and MRI are read by the radiologist. Plain radiographic images are visualized and preliminarily interpreted by the emergency physician with the below findings:      Interpretation per the Radiologist below, if available at the time ofthis note:    CT ABDOMEN PELVIS W IV CONTRAST   Final Result   Acute uncomplicated sigmoid diverticulitis               ED BEDSIDE ULTRASOUND:   Performed by ED Physician - none    LABS:  Labs Reviewed   CBC WITH AUTO DIFFERENTIAL - Abnormal; Notable for the following components:       Result Value    WBC 14.6 (*)     Neutrophils Absolute 10.2 (*)     Monocytes Absolute 1.4 (*)     All other components within normal limits    Narrative:     Performed at:  [address] [place]   Phone [phone]   COMPREHENSIVE METABOLIC PANEL W/ REFLEX TO MG FOR LOW K - Abnormal; Notable for the following components:    AST 42 (*)     All other components within normal limits    Narrative:     Performed at:  [address] [place]   Phone [phone]   URINE RT REFLEX TO CULTURE - Abnormal; Notable for the following components:    Clarity, UA CLOUDY (*)     Blood, Urine LARGE (*)     Protein, UA TRACE (*)     Leukocyte Esterase, Urine MODERATE (*)     All other components within normal limits    Narrative:     Performed at:  [address] [facility] [employer] [address]   Phone [phone]   MICROSCOPIC URINALYSIS - Abnormal; Notable for the following components:    RBC, UA  (*)     Bacteria, UA RARE (*)     WBC, UA 43 (*)     All other components within normal limits    Narrative:     Performed at:  [address] [facility] [employer] [address]   Phone [phone]   CULTURE, URINE   HCG, SERUM, QUALITATIVE    Narrative:     Performed at:  [facility] [address] [facility]  [address] [place], [place] [address]   Phone [phone]   LIPASE    Narrative:     Performed at:  [facility]  [address] [place] [address]   Phone [phone]       All other labs were within normal range or not returned as of this dictation. EMERGENCY DEPARTMENT COURSE and DIFFERENTIAL DIAGNOSIS/MDM:   Vitals:    Vitals:    02/05/21 1142 02/05/21 1615   BP: 127/84 134/78   Pulse: 82 74   Resp: 18 17   Temp: 99 °F (37.2 °C) 98 °F (36.7 °C)   TempSrc: Oral Oral   SpO2: 99% 98%   Weight: 214 lb 1.1 oz (97.1 kg)          Medical decision making: This is a 81-year-old female with history of diverticulitis who presents for evaluation of right lower quadrant abdominal pain which started approximately midnight last night. On exam she is well-appearing, afebrile, with normal vital signs. She does have reproducible tenderness in the right lower quadrant of the abdomen. Differential diagnosis includes appendicitis, diverticulitis, bowel obstruction, lower suspicion for biliary pathology, ovarian cyst or torsion based on location of pain. CBC shows a leukocytosis of 14.6. CMP is unremarkable. Lipase is normal.  Urinalysis shows large blood, moderate leukocytes, and 43 WBCs, concerning for urinary tract infection. CT of the abdomen pelvis shows acute uncomplicated sigmoid diverticulitis. Patient was previously treated about a year ago for diverticulitis with Augmentin, and will be prescribed this again to treat both diverticulitis as well as urinary tract infection. She did receive 1 dose of Zosyn in the emergency department.   Pain was controlled with morphine and Percocet in the emergency department, but counseled patient that we will prescribe ibuprofen for birth control at home as if her pain worsens we want her to return to the emergency department for reevaluation of possible development of complications from her

## 2021-02-05 NOTE — TELEPHONE ENCOUNTER
Reason for Disposition   SEVERE abdominal pain (e.g., excruciating)    Answer Assessment - Initial Assessment Questions  1. LOCATION: \"Where does it hurt? \"       Low mid to right abdominal Pain. 2. RADIATION: \"Does the pain shoot anywhere else? \" (e.g., chest, back)      Mild radiation to back. 3. ONSET: \"When did the pain begin? \" (e.g., minutes, hours or days ago)       Last night around midnight. 4. SUDDEN: \"Gradual or sudden onset? \"      Came on gradually. Began as pressure and became worse throughout the night. Patient endorses standing up straight. 5. PATTERN \"Does the pain come and go, or is it constant? \"     - If constant: \"Is it getting better, staying the same, or worsening? \"       (Note: Constant means the pain never goes away completely; most serious pain is constant and it progresses)      - If intermittent: \"How long does it last?\" \"Do you have pain now? \"      (Note: Intermittent means the pain goes away completely between bouts)      Constant and worsening. 6. SEVERITY: \"How bad is the pain? \"  (e.g., Scale 1-10; mild, moderate, or severe)    - MILD (1-3): doesn't interfere with normal activities, abdomen soft and not tender to touch     - MODERATE (4-7): interferes with normal activities or awakens from sleep, tender to touch     - SEVERE (8-10): excruciating pain, doubled over, unable to do any normal activities       8/10    7. RECURRENT SYMPTOM: \"Have you ever had this type of abdominal pain before? \" If so, ask: \"When was the last time? \" and \"What happened that time? \"       Has never had this specific pain. States 1 year ago, she had episode of diverticulitis, but the pain was different. 8. CAUSE: \"What do you think is causing the abdominal pain? \"      Appendicitis. 9. RELIEVING/AGGRAVATING FACTORS: \"What makes it better or worse? \" (e.g., movement, antacids, bowel movement)      Movement makes it worse, nothing is making it better.     10. OTHER SYMPTOMS: \"Has there been any vomiting, diarrhea, constipation, or urine problems? \"        Denies all. 11. PREGNANCY: \"Is there any chance you are pregnant? \" \"When was your last menstrual period? \"        Denies pregnancy. LMP: 1/8/2021    Protocols used: ABDOMINAL PAIN [facility]    Patient called at [facility])  with red flag complaint. Brief description of triage: Mid to RLQ abdominal pain since midnight, worsening. Triage indicates for patient to go to ER now. Patient verbalizes understanding and states her  will take her when he gets home. Care advice provided, patient verbalizes understanding; denies any other questions or concerns; instructed to call back for any new or worsening symptoms. .    Attention Provider: Thank you for allowing me to participate in the care of your patient. The patient was connected to triage in response to information provided to the [facility]. Please do not respond through this encounter as the response is not directed to a shared pool.

## 2021-02-06 LAB
ORGANISM: ABNORMAL
URINE CULTURE, ROUTINE: ABNORMAL
URINE CULTURE, ROUTINE: ABNORMAL

## 2023-02-27 ENCOUNTER — OFFICE VISIT (OUTPATIENT)
Dept: FAMILY MEDICINE CLINIC | Age: 48
End: 2023-02-27
Payer: COMMERCIAL

## 2023-02-27 VITALS
HEIGHT: 61 IN | WEIGHT: 178 LBS | SYSTOLIC BLOOD PRESSURE: 112 MMHG | OXYGEN SATURATION: 98 % | DIASTOLIC BLOOD PRESSURE: 72 MMHG | BODY MASS INDEX: 33.61 KG/M2 | HEART RATE: 66 BPM

## 2023-02-27 DIAGNOSIS — R00.0 RAPID HEART BEAT: Primary | ICD-10-CM

## 2023-02-27 PROCEDURE — 1036F TOBACCO NON-USER: CPT | Performed by: FAMILY MEDICINE

## 2023-02-27 PROCEDURE — 99214 OFFICE O/P EST MOD 30 MIN: CPT | Performed by: FAMILY MEDICINE

## 2023-02-27 PROCEDURE — G8484 FLU IMMUNIZE NO ADMIN: HCPCS | Performed by: FAMILY MEDICINE

## 2023-02-27 PROCEDURE — G8427 DOCREV CUR MEDS BY ELIG CLIN: HCPCS | Performed by: FAMILY MEDICINE

## 2023-02-27 PROCEDURE — G8417 CALC BMI ABV UP PARAM F/U: HCPCS | Performed by: FAMILY MEDICINE

## 2023-02-27 PROCEDURE — 93000 ELECTROCARDIOGRAM COMPLETE: CPT | Performed by: FAMILY MEDICINE

## 2023-02-27 RX ORDER — SEMAGLUTIDE 1.34 MG/ML
INJECTION, SOLUTION SUBCUTANEOUS
COMMUNITY
Start: 2023-02-13

## 2023-02-27 SDOH — ECONOMIC STABILITY: HOUSING INSECURITY
IN THE LAST 12 MONTHS, WAS THERE A TIME WHEN YOU DID NOT HAVE A STEADY PLACE TO SLEEP OR SLEPT IN A SHELTER (INCLUDING NOW)?: NO

## 2023-02-27 SDOH — ECONOMIC STABILITY: FOOD INSECURITY: WITHIN THE PAST 12 MONTHS, THE FOOD YOU BOUGHT JUST DIDN'T LAST AND YOU DIDN'T HAVE MONEY TO GET MORE.: NEVER TRUE

## 2023-02-27 SDOH — ECONOMIC STABILITY: INCOME INSECURITY: HOW HARD IS IT FOR YOU TO PAY FOR THE VERY BASICS LIKE FOOD, HOUSING, MEDICAL CARE, AND HEATING?: NOT VERY HARD

## 2023-02-27 SDOH — ECONOMIC STABILITY: TRANSPORTATION INSECURITY
IN THE PAST 12 MONTHS, HAS LACK OF TRANSPORTATION KEPT YOU FROM MEETINGS, WORK, OR FROM GETTING THINGS NEEDED FOR DAILY LIVING?: NO

## 2023-02-27 SDOH — ECONOMIC STABILITY: FOOD INSECURITY: WITHIN THE PAST 12 MONTHS, YOU WORRIED THAT YOUR FOOD WOULD RUN OUT BEFORE YOU GOT MONEY TO BUY MORE.: NEVER TRUE

## 2023-02-27 ASSESSMENT — PATIENT HEALTH QUESTIONNAIRE - PHQ9
SUM OF ALL RESPONSES TO PHQ QUESTIONS 1-9: 0
SUM OF ALL RESPONSES TO PHQ QUESTIONS 1-9: 0
SUM OF ALL RESPONSES TO PHQ9 QUESTIONS 1 & 2: 0
SUM OF ALL RESPONSES TO PHQ QUESTIONS 1-9: 0
1. LITTLE INTEREST OR PLEASURE IN DOING THINGS: 0
SUM OF ALL RESPONSES TO PHQ QUESTIONS 1-9: 0
2. FEELING DOWN, DEPRESSED OR HOPELESS: 0

## 2023-02-27 NOTE — PATIENT INSTRUCTIONS
INSTRUCTIONS  NEXT APPOINTMENT: Please schedule check-up in 1 month with Wing Johnson (Nurse Practitioner). Get Holter monitor. Likely needs cardiology after that. No exercising alone incase of passing out.       Patient Education

## 2023-02-27 NOTE — PROGRESS NOTES
PROBLEM VISIT NOTE     Subjective:     Chief Complaint   Patient presents with    Tachycardia     Rapid heart rate, can be resting or exercising. 120 at rest, with exercise - was 180. Off and on for 2 months. Iban Cruz is a 56 y.o. female who presents with episodic palpitations x 8 months, at rest or activity. 120-190. At least daily. Lasts minutes. Sometimes light headed. No syncope. Family HX- bro had ablation in past.    ECHO OK in 2020    TSH and CBC good in January. BMP good in December. Health Maintenance Due   Topic Date Due    COVID-19 Vaccine (1) Never done    Depression Screen  Never done    HIV screen  Never done    Hepatitis C screen  Never done    Cervical cancer screen  Never done    Lipids  Never done    Flu vaccine (1) 08/01/2022     CHART REVIEW   reports that she has never smoked.  She has never used smokeless tobacco.  Health Maintenance Due   Topic Date Due    COVID-19 Vaccine (1) Never done    Depression Screen  Never done    HIV screen  Never done    Hepatitis C screen  Never done    Cervical cancer screen  Never done    Lipids  Never done    Flu vaccine (1) 08/01/2022     Current Outpatient Medications   Medication Instructions    cabergoline (DOSTINEX) 0.5 mg, TWICE WEEKLY    OZEMPIC, 0.25 OR 0.5 MG/DOSE, 2 MG/1.5ML SOPN INJECT 0.5 MG UNDER THE SKIN EVERY 7 DAYS     LAST LABS=- see [facility]  No results found for: LDL, LDLCALCNo results found for: HDLNo results found for: TRIG  Lab Results   Component Value Date     02/05/2021    K 4.3 02/05/2021    CREATININE 0.8 02/05/2021     Lab Results   Component Value Date    WBC 14.6 (H) 02/05/2021    HGB 13.1 02/05/2021     02/05/2021     Lab Results   Component Value Date    ALT 37 02/05/2021    AST 42 (H) 02/05/2021    ALKPHOS 108 02/05/2021    BILITOT 0.5 02/05/2021     TSH (uIU/mL)   Date Value   06/23/2015 1.74     No results found for: LABA1C  Objective:   PHYSICAL EXAM   /72 (Site: Left Upper Arm, Position: Sitting, Cuff Size: Medium Adult)   Pulse 66   Ht 5' 1\" (1.549 m)   Wt 178 lb (80.7 kg)   SpO2 98%   BMI 33.63 kg/m²   BP Readings from Last 5 Encounters:   02/27/23 112/72   02/05/21 134/78   02/28/20 100/60   02/24/20 (!) 104/52   01/26/18 118/60     Wt Readings from Last 5 Encounters:   02/27/23 178 lb (80.7 kg)   02/05/21 214 lb 1.1 oz (97.1 kg)   02/28/20 207 lb (93.9 kg)   02/24/20 208 lb 1.8 oz (94.4 kg)   01/26/18 193 lb (87.5 kg)      GENERAL:   well-developed, well-nourished, alert, no distress. LUNGS:    Breathing unlabored  clear to auscultation bilaterally and good air movement  CARDIOVASC:   regular rate and rhythm  SKIN: warm and dry     Assessment and Plan:      Diagnosis Orders   1. Rapid heart beat  EKG 12 Lead - Clinic Performed    Holter Monitor 48 Hour         INSTRUCTIONS  NEXT APPOINTMENT: Please schedule check-up in 1 month with Miri Issa (Nurse Practitioner). Get Holter monitor. Likely needs cardiology after that. No exercising alone incase of passing out.

## 2023-03-02 ENCOUNTER — HOSPITAL ENCOUNTER (OUTPATIENT)
Dept: NON INVASIVE DIAGNOSTICS | Age: 48
Discharge: HOME | End: 2023-03-02
Payer: COMMERCIAL

## 2023-03-02 DIAGNOSIS — R00.0 RAPID HEART BEAT: ICD-10-CM

## 2023-03-02 PROCEDURE — 93225 XTRNL ECG REC<48 HRS REC: CPT

## 2023-03-02 PROCEDURE — 93226 XTRNL ECG REC<48 HR SCAN A/R: CPT

## 2023-04-03 ENCOUNTER — OFFICE VISIT (OUTPATIENT)
Dept: FAMILY MEDICINE CLINIC | Age: 48
End: 2023-04-03
Payer: COMMERCIAL

## 2023-04-03 VITALS
HEIGHT: 61 IN | WEIGHT: 177 LBS | SYSTOLIC BLOOD PRESSURE: 100 MMHG | HEART RATE: 90 BPM | BODY MASS INDEX: 33.42 KG/M2 | OXYGEN SATURATION: 98 % | DIASTOLIC BLOOD PRESSURE: 70 MMHG

## 2023-04-03 DIAGNOSIS — R00.0 RAPID HEART BEAT: Primary | ICD-10-CM

## 2023-04-03 PROCEDURE — G8417 CALC BMI ABV UP PARAM F/U: HCPCS | Performed by: FAMILY MEDICINE

## 2023-04-03 PROCEDURE — 99213 OFFICE O/P EST LOW 20 MIN: CPT | Performed by: FAMILY MEDICINE

## 2023-04-03 PROCEDURE — G8427 DOCREV CUR MEDS BY ELIG CLIN: HCPCS | Performed by: FAMILY MEDICINE

## 2023-04-03 PROCEDURE — 1036F TOBACCO NON-USER: CPT | Performed by: FAMILY MEDICINE

## 2023-04-03 NOTE — PATIENT INSTRUCTIONS
INSTRUCTIONS  NEXT APPOINTMENT: Please schedule annual complete physical (30 minutes) in 6 months with Dr. Aleta Logan or her NP, Valora Bumpers. Ok to exercise. Target heart rate 160-170. If getting over 180, stretch for minutes until it comes down.

## 2023-04-03 NOTE — PROGRESS NOTES
CHRONIC CONDITION FOLLOW-UP     Assessment and Plan:      Diagnosis Orders   1. Rapid heart beat        Stable     Continue current Tx plan. Any changes marked below. INSTRUCTIONS  NEXT APPOINTMENT: Please schedule annual complete physical (30 minutes) in 6 months with Dr. Luis E Watters or her NP, Lulu Oppenheim. Ok to exercise. Target heart rate 160-170. If getting over 180, stretch for minutes until it comes down. Subjective:      Chief Complaint   Patient presents with    Tachycardia     1 mo F/u from holter monitor. Nimisha Salcido is an 56 y.o. female who presents for follow up    Complaints:   Reduced to moderate exercise but HR still gets up to 150's. HOLTER REPORT  INTERPRETATION  This 48-hour test was scanned by Barb Good RN on 03/06/2023 at 13:30. Tech comments:  1. The rhythm was sinus with sinus arrhythmia. Average HR 86. Tachycardia present for 15% of test [approximately 2 hours documented as treadmill /exercise]. Sinus tach  2. Diary entries of \"high heart rate\" with sinus tachycardia and isolated PVC occurring during exercise. Confirmed by MAKENNA ROMANO, Tito Wilkerson ([id number]) on 3/6/2023 1:58:12 PM    CHART REVIEW   reports that she has never smoked.  She has never used smokeless tobacco.  Health Maintenance Due   Topic Date Due    COVID-19 Vaccine (1) Never done    HIV screen  Never done    Hepatitis C screen  Never done    Cervical cancer screen  Never done    Lipids  Never done     Current Outpatient Medications   Medication Instructions    cabergoline (DOSTINEX) 0.5 mg, TWICE WEEKLY    OZEMPIC, 0.25 OR 0.5 MG/DOSE, 2 MG/1.5ML SOPN INJECT 0.5 MG UNDER THE SKIN EVERY 7 DAYS     LAST LABS  No results found for: LDLCALC, LDLCHOLESTEROL, LDLDIRECT  No results found for: HDLNo results found for: TRIG  Lab Results   Component Value Date    ALT 37 02/05/2021    AST 42 (H) 02/05/2021    ALKPHOS 108 02/05/2021    BILITOT 0.5 02/05/2021     Lab Results   Component Value Date     02/05/2021    K

## 2023-10-13 ENCOUNTER — OFFICE VISIT (OUTPATIENT)
Dept: FAMILY MEDICINE CLINIC | Age: 48
End: 2023-10-13
Payer: COMMERCIAL

## 2023-10-13 VITALS
BODY MASS INDEX: 34.74 KG/M2 | HEART RATE: 66 BPM | DIASTOLIC BLOOD PRESSURE: 66 MMHG | HEIGHT: 61 IN | WEIGHT: 184 LBS | OXYGEN SATURATION: 97 % | SYSTOLIC BLOOD PRESSURE: 104 MMHG

## 2023-10-13 DIAGNOSIS — E66.9 OBESITY (BMI 30.0-34.9): ICD-10-CM

## 2023-10-13 DIAGNOSIS — Z28.21 COVID-19 VACCINE REGIMEN TO MAINTAIN IMMUNITY DECLINED: ICD-10-CM

## 2023-10-13 DIAGNOSIS — Z00.00 ENCOUNTER FOR WELL ADULT EXAM WITHOUT ABNORMAL FINDINGS: Primary | ICD-10-CM

## 2023-10-13 DIAGNOSIS — Z85.828 HISTORY OF BASAL CELL CARCINOMA (BCC): ICD-10-CM

## 2023-10-13 DIAGNOSIS — E22.1 HYPERPROLACTINEMIA (HCC): ICD-10-CM

## 2023-10-13 DIAGNOSIS — E78.00 HYPERCHOLESTEROLEMIA: ICD-10-CM

## 2023-10-13 PROCEDURE — 99396 PREV VISIT EST AGE 40-64: CPT | Performed by: FAMILY MEDICINE

## 2023-10-13 PROCEDURE — G8484 FLU IMMUNIZE NO ADMIN: HCPCS | Performed by: FAMILY MEDICINE

## 2023-10-13 NOTE — PATIENT INSTRUCTIONS
INSTRUCTIONS  NEXT APPOINTMENT: Please schedule fasting annual physical (30 minutes) in one year. OK to have water, black coffee and medications (except for diabetes medicines)  with Dr. Jony Angel or her NP, Governor Lopez. PLEASE TAKE THIS FORM TO CHECK-OUT WINDOW TO SCHEDULE NEXT VISIT. Send in copy of recent labs by [facility]. Ask GYN to fax PAP result to Dr. Jony Angel at [phone]. Patient Education           Starting a Weight Loss Plan: Care Instructions  Overview     If you're thinking about losing weight, it can be hard to know where to start. Your doctor can help you set up a weight loss plan that best meets your needs. You may want to take a class on nutrition or exercise, or you could join a weight loss support group. If you have questions about how to make changes to your eating or exercise habits, ask your doctor about seeing a registered dietitian or an exercise specialist.  It can be a big challenge to lose weight. But you don't have to make huge changes at once. Make small changes, and stick with them. When those changes become habit, add a few more changes. If you don't think you're ready to make changes right now, try to pick a date in the future. Make an appointment to see your doctor to discuss whether the time is right for you to start a plan. Follow-up care is a key part of your treatment and safety. Be sure to make and go to all appointments, and call your doctor if you are having problems. It's also a good idea to know your test results and keep a list of the medicines you take. How can you care for yourself at home? Set realistic goals. Many people expect to lose much more weight than is likely. A weight loss of 5% to 10% of your body weight may be enough to improve your health. Get family and friends involved to provide support. Talk to them about why you are trying to lose weight, and ask them to help.  They can help by participating in exercise and having meals with you, even if they may be

## 2023-10-13 NOTE — PROGRESS NOTES
PHYSICAL-VISIT NOTE   Assessment and Plan:      Diagnosis Orders   1. Encounter for well adult exam without abnormal findings        2. Hypercholesterolemia        3. Hyperprolactinemia ([address])        4. History of basal cell carcinoma (BCC)        5. Obesity (BMI 30.0-34. 9)        Stable. Plan as above and below. INSTRUCTIONS  NEXT APPOINTMENT: Please schedule fasting annual physical (30 minutes) in one year. OK to have water, black coffee and medications (except for diabetes medicines)  with Dr. Kevin Thurman or her NP, Faby Edouard. PLEASE TAKE THIS FORM TO CHECK-OUT WINDOW TO SCHEDULE NEXT VISIT. Send in copy of recent labs by [address] [place] [address]. Ask GYN to fax PAP result to Dr. Kevin Thurman at [phone]. Subjective:     Chief Complaint   Patient presents with    Annual Exam     Physical exam       Wilder Wright is a 53 y.o. female who presents for annual testing/preventive review and check-up of medical problems listed below:  1. Encounter for well adult exam without abnormal findings    2. Hypercholesterolemia    3. Hyperprolactinemia ([address])    4. History of basal cell carcinoma (BCC)    5. Obesity (BMI 30.0-34. 9)        Complaints: COVID 2 weeks ago. Been off ozempic 0.5. Walking 10K steps a day. Watching portion sizes. ROS  See scanned \"Annual Adult Health Checklist\". Pertinent positives addressed above. CHART REVIEW  Social History     Tobacco Use    Smoking status: Never    Smokeless tobacco: Never    Tobacco comments:     congrats   Substance Use Topics    Alcohol use:  Yes     Alcohol/week: 3.3 standard drinks of alcohol     Types: 4 Standard drinks or equivalent per week    Drug use: No      Health Maintenance Due   Topic Date Due    Hepatitis B vaccine (1 of 3 - 3-dose series) Never done    COVID-19 Vaccine (1) Never done    HIV screen  Never done    Hepatitis C screen  Never done    Cervical cancer screen  Never done    Lipids  Never done    Flu vaccine (1) 08/01/2023     Current Outpatient Medications

## 2024-10-30 ENCOUNTER — OFFICE VISIT (OUTPATIENT)
Dept: FAMILY MEDICINE CLINIC | Age: 49
End: 2024-10-30

## 2024-10-30 VITALS
HEART RATE: 74 BPM | OXYGEN SATURATION: 98 % | WEIGHT: 213 LBS | DIASTOLIC BLOOD PRESSURE: 72 MMHG | TEMPERATURE: 98.5 F | BODY MASS INDEX: 40.22 KG/M2 | RESPIRATION RATE: 16 BRPM | SYSTOLIC BLOOD PRESSURE: 108 MMHG | HEIGHT: 61 IN

## 2024-10-30 DIAGNOSIS — J20.9 ACUTE BRONCHITIS, UNSPECIFIED ORGANISM: Primary | ICD-10-CM

## 2024-10-30 DIAGNOSIS — J45.21 MILD INTERMITTENT REACTIVE AIRWAY DISEASE WITH ACUTE EXACERBATION: ICD-10-CM

## 2024-10-30 RX ORDER — PREDNISONE 10 MG/1
10 TABLET ORAL 2 TIMES DAILY
Qty: 10 TABLET | Refills: 0 | Status: SHIPPED | OUTPATIENT
Start: 2024-10-30 | End: 2024-11-04

## 2024-10-30 RX ORDER — ALBUTEROL SULFATE 90 UG/1
2 INHALANT RESPIRATORY (INHALATION) EVERY 4 HOURS PRN
Qty: 18 G | Refills: 3 | Status: SHIPPED | OUTPATIENT
Start: 2024-10-30 | End: 2025-10-30

## 2024-10-30 SDOH — ECONOMIC STABILITY: FOOD INSECURITY: WITHIN THE PAST 12 MONTHS, YOU WORRIED THAT YOUR FOOD WOULD RUN OUT BEFORE YOU GOT MONEY TO BUY MORE.: NEVER TRUE

## 2024-10-30 SDOH — ECONOMIC STABILITY: FOOD INSECURITY: WITHIN THE PAST 12 MONTHS, THE FOOD YOU BOUGHT JUST DIDN'T LAST AND YOU DIDN'T HAVE MONEY TO GET MORE.: NEVER TRUE

## 2024-10-30 SDOH — ECONOMIC STABILITY: INCOME INSECURITY: HOW HARD IS IT FOR YOU TO PAY FOR THE VERY BASICS LIKE FOOD, HOUSING, MEDICAL CARE, AND HEATING?: NOT HARD AT ALL

## 2024-10-30 ASSESSMENT — PATIENT HEALTH QUESTIONNAIRE - PHQ9
SUM OF ALL RESPONSES TO PHQ QUESTIONS 1-9: 0
1. LITTLE INTEREST OR PLEASURE IN DOING THINGS: NOT AT ALL
2. FEELING DOWN, DEPRESSED OR HOPELESS: NOT AT ALL
SUM OF ALL RESPONSES TO PHQ9 QUESTIONS 1 & 2: 0
SUM OF ALL RESPONSES TO PHQ QUESTIONS 1-9: 0

## 2024-10-30 NOTE — PROGRESS NOTES
PROBLEM VISIT NOTE   Assessment and Plan:      Diagnosis Orders   1. Acute bronchitis, unspecified organism  predniSONE (DELTASONE) 10 MG tablet    albuterol sulfate HFA (PROVENTIL;VENTOLIN;PROAIR) 108 (90 Base) MCG/ACT inhaler      2. Mild intermittent reactive airway disease with acute exacerbation  predniSONE (DELTASONE) 10 MG tablet    albuterol sulfate HFA (PROVENTIL;VENTOLIN;PROAIR) 108 (90 Base) MCG/ACT inhaler           INSTRUCTIONS  Take prednisone for 5 days.  Use albuterol every 4 hours as needed.     Subjective:     Chief Complaint   Patient presents with    Congestion     X2 weeks  Pt did a round of antibiotics, still does not feel well.     Cough     Bran Segura is a 49 y.o. female who presents follow-up from Urgent Care 10/22 with symptoms for 8 days. Dx sinusitis and Rx Augmentin. Started with fevers, aches and fatigue.  Duration 16 d  Coughing daily.   Tight in chest, some wheeze, cough if talk a lot or laugh.  Less congestion, no more fever.    CHART REVIEW   reports that she has never smoked. She has never used smokeless tobacco.  Health Maintenance Due   Topic Date Due    Hepatitis C screen  Never done    Cervical cancer screen  Never done    Lipids  Never done    Flu vaccine (1) 08/01/2024    COVID-19 Vaccine (1 - 2023-24 season) Never done     Current Outpatient Medications   Medication Instructions    albuterol sulfate HFA (PROVENTIL;VENTOLIN;PROAIR) 108 (90 Base) MCG/ACT inhaler 2 puffs, Inhalation, EVERY 4 HOURS PRN, May substitute for insurance preferred (Ventolin, Proventil, ProAir)    cabergoline (DOSTINEX) 0.5 mg, TWICE WEEKLY    predniSONE (DELTASONE) 10 mg, Oral, 2 TIMES DAILY     LAST LABS  No results found for: \"LDL\"No results found for: \"HDL\"No results found for: \"TRIG\"  Lab Results   Component Value Date     10/16/2024    K 3.9 10/16/2024    CREATININE 0.71 10/16/2024     Lab Results   Component Value Date    WBC 14.6 (H) 02/05/2021    HGB 13.1 02/05/2021